# Patient Record
Sex: MALE | Race: WHITE | NOT HISPANIC OR LATINO | URBAN - METROPOLITAN AREA
[De-identification: names, ages, dates, MRNs, and addresses within clinical notes are randomized per-mention and may not be internally consistent; named-entity substitution may affect disease eponyms.]

---

## 2017-12-11 ENCOUNTER — APPOINTMENT (RX ONLY)
Dept: URBAN - METROPOLITAN AREA CLINIC 51 | Facility: CLINIC | Age: 47
Setting detail: DERMATOLOGY
End: 2017-12-11

## 2017-12-11 DIAGNOSIS — L81.7 PIGMENTED PURPURIC DERMATOSIS: ICD-10-CM

## 2017-12-11 PROBLEM — L30.9 DERMATITIS, UNSPECIFIED: Status: ACTIVE | Noted: 2017-12-11

## 2017-12-11 PROCEDURE — 99201: CPT

## 2017-12-11 PROCEDURE — ? ORDER TESTS

## 2017-12-11 PROCEDURE — ? TREATMENT REGIMEN

## 2017-12-11 PROCEDURE — ? PRESCRIPTION

## 2017-12-11 PROCEDURE — ? COUNSELING

## 2017-12-11 RX ORDER — CEPHALEXIN 500 MG/1
CAPSULE ORAL
Qty: 42 | Refills: 0 | Status: ERX | COMMUNITY
Start: 2017-12-11

## 2017-12-11 RX ORDER — MUPIROCIN 20 MG/G
OINTMENT TOPICAL
Qty: 1 | Refills: 1 | Status: ERX | COMMUNITY
Start: 2017-12-11

## 2017-12-11 RX ADMIN — MUPIROCIN: 20 OINTMENT TOPICAL at 00:00

## 2017-12-11 RX ADMIN — CEPHALEXIN: 500 CAPSULE ORAL at 00:00

## 2017-12-11 ASSESSMENT — SEVERITY ASSESSMENT: SEVERITY: MODERATE TO SEVERE

## 2017-12-11 ASSESSMENT — BSA RASH: BSA RASH: 5

## 2017-12-11 NOTE — PROCEDURE: ORDER TESTS
Lab Facility: [id number]
Performing Laboratory: [id number]
Bill For Surgical Tray: no
Billing Type: Third-Party Bill
Expected Date Of Service: 12/11/2017

## 2017-12-11 NOTE — PROCEDURE: TREATMENT REGIMEN
Detail Level: Zone
Initiate Treatment: Keflex 500 TID, Mupirocin BID
Plan: Will start triamcinolone at follow up. Culture taken from right foot.

## 2017-12-18 ENCOUNTER — RX ONLY (OUTPATIENT)
Age: 47
Setting detail: RX ONLY
End: 2017-12-18

## 2017-12-18 RX ORDER — CLINDAMYCIN HYDROCHLORIDE 300 MG/1
CAPSULE ORAL
Qty: 56 | Refills: 0 | Status: ERX | COMMUNITY
Start: 2017-12-18

## 2018-01-04 ENCOUNTER — APPOINTMENT (RX ONLY)
Dept: URBAN - METROPOLITAN AREA CLINIC 51 | Facility: CLINIC | Age: 48
Setting detail: DERMATOLOGY
End: 2018-01-04

## 2018-01-04 DIAGNOSIS — L81.7 PIGMENTED PURPURIC DERMATOSIS: ICD-10-CM

## 2018-01-04 PROBLEM — L30.9 DERMATITIS, UNSPECIFIED: Status: ACTIVE | Noted: 2018-01-04

## 2018-01-04 PROCEDURE — ? TREATMENT REGIMEN

## 2018-01-04 PROCEDURE — 99212 OFFICE O/P EST SF 10 MIN: CPT

## 2018-01-04 PROCEDURE — ? COUNSELING

## 2018-01-04 PROCEDURE — ? PRESCRIPTION

## 2018-01-04 RX ORDER — MUPIROCIN 20 MG/G
OINTMENT TOPICAL
Qty: 1 | Refills: 1 | Status: ERX

## 2018-01-04 RX ORDER — CLINDAMYCIN HYDROCHLORIDE 300 MG/1
CAPSULE ORAL
Qty: 40 | Refills: 0 | Status: ERX

## 2018-01-04 ASSESSMENT — BSA RASH: BSA RASH: 5

## 2018-01-04 ASSESSMENT — LOCATION ZONE DERM: LOCATION ZONE: FEET

## 2018-01-04 ASSESSMENT — LOCATION SIMPLE DESCRIPTION DERM: LOCATION SIMPLE: RIGHT FOOT

## 2018-01-04 ASSESSMENT — LOCATION DETAILED DESCRIPTION DERM: LOCATION DETAILED: RIGHT DORSAL FOOT

## 2018-01-04 ASSESSMENT — SEVERITY ASSESSMENT: SEVERITY: MODERATE TO SEVERE

## 2018-01-04 NOTE — PROCEDURE: TREATMENT REGIMEN
Detail Level: Zone
Plan: Gave patient information on Dr. Danielito Wall in [place] for vascular surgery.
Continue Regimen: Mupirocin\\nCleocin

## 2018-01-25 RX ORDER — CLINDAMYCIN HYDROCHLORIDE 300 MG/1
CAPSULE ORAL
Qty: 40 | Refills: 0 | Status: ERX

## 2018-01-29 ENCOUNTER — APPOINTMENT (RX ONLY)
Dept: URBAN - METROPOLITAN AREA CLINIC 51 | Facility: CLINIC | Age: 48
Setting detail: DERMATOLOGY
End: 2018-01-29

## 2018-01-29 DIAGNOSIS — L30.9 DERMATITIS, UNSPECIFIED: ICD-10-CM

## 2018-01-29 PROCEDURE — ? COUNSELING

## 2018-01-29 PROCEDURE — 99212 OFFICE O/P EST SF 10 MIN: CPT

## 2018-01-29 PROCEDURE — ? PRESCRIPTION

## 2018-01-29 RX ORDER — RETAPAMULIN 10 MG/G
OINTMENT TOPICAL
Qty: 1 | Refills: 0 | Status: ERX | COMMUNITY
Start: 2018-01-29

## 2018-01-29 RX ORDER — TRIAMCINOLONE ACETONIDE 1 MG/G
OINTMENT TOPICAL
Qty: 1 | Refills: 0 | Status: ERX | COMMUNITY
Start: 2018-01-29

## 2018-01-29 RX ADMIN — RETAPAMULIN: 10 OINTMENT TOPICAL at 00:00

## 2018-01-29 RX ADMIN — TRIAMCINOLONE ACETONIDE: 1 OINTMENT TOPICAL at 00:00

## 2018-01-29 ASSESSMENT — LOCATION DETAILED DESCRIPTION DERM
LOCATION DETAILED: RIGHT DORSAL FOOT
LOCATION DETAILED: LEFT DORSAL FOOT

## 2018-01-29 ASSESSMENT — BSA RASH: BSA RASH: 5

## 2018-01-29 ASSESSMENT — LOCATION SIMPLE DESCRIPTION DERM
LOCATION SIMPLE: LEFT FOOT
LOCATION SIMPLE: RIGHT FOOT

## 2018-01-29 ASSESSMENT — LOCATION ZONE DERM: LOCATION ZONE: FEET

## 2018-01-29 ASSESSMENT — SEVERITY ASSESSMENT: SEVERITY: MODERATE TO SEVERE

## 2018-01-30 ENCOUNTER — RX ONLY (OUTPATIENT)
Age: 48
Setting detail: RX ONLY
End: 2018-01-30

## 2018-01-30 RX ORDER — SILVER SULFADIAZINE 10 MG/G
CREAM TOPICAL
Qty: 50 | Refills: 0 | Status: ERX | COMMUNITY
Start: 2018-01-30

## 2018-05-10 ENCOUNTER — APPOINTMENT (RX ONLY)
Dept: URBAN - METROPOLITAN AREA CLINIC 51 | Facility: CLINIC | Age: 48
Setting detail: DERMATOLOGY
End: 2018-05-10

## 2018-05-10 DIAGNOSIS — L0390 CELLULITIS AND ABSCESS OF UNSPECIFIED SITES: ICD-10-CM

## 2018-05-10 DIAGNOSIS — L0391 CELLULITIS AND ABSCESS OF UNSPECIFIED SITES: ICD-10-CM

## 2018-05-10 PROBLEM — L03.115 CELLULITIS OF RIGHT LOWER LIMB: Status: ACTIVE | Noted: 2018-05-10

## 2018-05-10 PROCEDURE — ? PRESCRIPTION

## 2018-05-10 PROCEDURE — ? COUNSELING

## 2018-05-10 PROCEDURE — 99213 OFFICE O/P EST LOW 20 MIN: CPT

## 2018-05-10 RX ORDER — MUPIROCIN 20 MG/G
OINTMENT TOPICAL
Qty: 1 | Refills: 5 | Status: ERX

## 2018-05-10 RX ORDER — CLINDAMYCIN HYDROCHLORIDE 300 MG/1
CAPSULE ORAL
Qty: 120 | Refills: 1 | Status: ERX

## 2018-05-10 ASSESSMENT — LOCATION SIMPLE DESCRIPTION DERM: LOCATION SIMPLE: RIGHT FOOT

## 2018-05-10 ASSESSMENT — LOCATION ZONE DERM: LOCATION ZONE: FEET

## 2018-05-10 ASSESSMENT — LOCATION DETAILED DESCRIPTION DERM: LOCATION DETAILED: RIGHT DORSAL FOOT

## 2018-05-10 NOTE — HPI: RASH
How Severe Is Your Rash?: mild
Is This A New Presentation, Or A Follow-Up?: Rash
Additional History: Patient was treated for staph in January with cleocin and mupirocin

## 2018-07-25 ENCOUNTER — APPOINTMENT (RX ONLY)
Dept: URBAN - METROPOLITAN AREA CLINIC 51 | Facility: CLINIC | Age: 48
Setting detail: DERMATOLOGY
End: 2018-07-25

## 2018-07-25 DIAGNOSIS — L0391 CELLULITIS AND ABSCESS OF UNSPECIFIED SITES: ICD-10-CM

## 2018-07-25 DIAGNOSIS — L0390 CELLULITIS AND ABSCESS OF UNSPECIFIED SITES: ICD-10-CM

## 2018-07-25 PROBLEM — L03.115 CELLULITIS OF RIGHT LOWER LIMB: Status: ACTIVE | Noted: 2018-07-25

## 2018-07-25 PROCEDURE — ? TREATMENT REGIMEN

## 2018-07-25 PROCEDURE — 99212 OFFICE O/P EST SF 10 MIN: CPT

## 2018-07-25 PROCEDURE — ? PRESCRIPTION

## 2018-07-25 PROCEDURE — ? COUNSELING

## 2018-07-25 RX ORDER — SILVER SULFADIAZINE 10 MG/G
CREAM TOPICAL
Qty: 400 | Refills: 3 | Status: ERX

## 2018-07-25 ASSESSMENT — PAIN INTENSITY VAS: HOW INTENSE IS YOUR PAIN 0 BEING NO PAIN, 10 BEING THE MOST SEVERE PAIN POSSIBLE?: NO PAIN

## 2018-07-25 ASSESSMENT — LOCATION DETAILED DESCRIPTION DERM: LOCATION DETAILED: RIGHT DORSAL FOOT

## 2018-07-25 ASSESSMENT — LOCATION ZONE DERM: LOCATION ZONE: FEET

## 2018-07-25 ASSESSMENT — SEVERITY ASSESSMENT: SEVERITY: MILD TO MODERATE

## 2018-07-25 ASSESSMENT — LOCATION SIMPLE DESCRIPTION DERM: LOCATION SIMPLE: RIGHT FOOT

## 2018-07-25 NOTE — PROCEDURE: TREATMENT REGIMEN
Plan: Patient states Silvadene works best.
Initiate Treatment: Silvadene
Detail Level: Detailed
Otc Regimen: Domeboro soak

## 2018-08-16 ENCOUNTER — APPOINTMENT (RX ONLY)
Dept: URBAN - METROPOLITAN AREA CLINIC 51 | Facility: CLINIC | Age: 48
Setting detail: DERMATOLOGY
End: 2018-08-16

## 2018-08-16 DIAGNOSIS — L0390 CELLULITIS AND ABSCESS OF UNSPECIFIED SITES: ICD-10-CM | Status: STABLE

## 2018-08-16 DIAGNOSIS — L0391 CELLULITIS AND ABSCESS OF UNSPECIFIED SITES: ICD-10-CM | Status: STABLE

## 2018-08-16 PROBLEM — L03.115 CELLULITIS OF RIGHT LOWER LIMB: Status: ACTIVE | Noted: 2018-08-16

## 2018-08-16 PROCEDURE — 99212 OFFICE O/P EST SF 10 MIN: CPT

## 2018-08-16 PROCEDURE — ? COUNSELING

## 2018-08-16 PROCEDURE — ? TREATMENT REGIMEN

## 2018-08-16 ASSESSMENT — LOCATION DETAILED DESCRIPTION DERM: LOCATION DETAILED: RIGHT DORSAL FOOT

## 2018-08-16 ASSESSMENT — SEVERITY ASSESSMENT: SEVERITY: MILD

## 2018-08-16 ASSESSMENT — LOCATION ZONE DERM: LOCATION ZONE: FEET

## 2018-08-16 ASSESSMENT — PAIN INTENSITY VAS: HOW INTENSE IS YOUR PAIN 0 BEING NO PAIN, 10 BEING THE MOST SEVERE PAIN POSSIBLE?: NO PAIN

## 2018-08-16 ASSESSMENT — LOCATION SIMPLE DESCRIPTION DERM: LOCATION SIMPLE: RIGHT FOOT

## 2018-08-16 NOTE — PROCEDURE: TREATMENT REGIMEN
Plan: Patient states that he plans to call Dr Wall in [place] for an appointment. Per Slava he may call for any refills without an appointment
Continue Regimen: Silvadene and domeboro soaks
Detail Level: Detailed

## 2018-09-06 ENCOUNTER — APPOINTMENT (RX ONLY)
Dept: URBAN - METROPOLITAN AREA CLINIC 51 | Facility: CLINIC | Age: 48
Setting detail: DERMATOLOGY
End: 2018-09-06

## 2018-09-06 DIAGNOSIS — L0390 CELLULITIS AND ABSCESS OF UNSPECIFIED SITES: ICD-10-CM

## 2018-09-06 DIAGNOSIS — L0391 CELLULITIS AND ABSCESS OF UNSPECIFIED SITES: ICD-10-CM

## 2018-09-06 PROBLEM — L03.115 CELLULITIS OF RIGHT LOWER LIMB: Status: ACTIVE | Noted: 2018-09-06

## 2018-09-06 PROBLEM — L03.116 CELLULITIS OF LEFT LOWER LIMB: Status: ACTIVE | Noted: 2018-09-06

## 2018-09-06 PROCEDURE — ? ORDER TESTS

## 2018-09-06 ASSESSMENT — LOCATION SIMPLE DESCRIPTION DERM
LOCATION SIMPLE: RIGHT PRETIBIAL REGION
LOCATION SIMPLE: LEFT PRETIBIAL REGION

## 2018-09-06 ASSESSMENT — LOCATION DETAILED DESCRIPTION DERM
LOCATION DETAILED: RIGHT PROXIMAL PRETIBIAL REGION
LOCATION DETAILED: LEFT PROXIMAL PRETIBIAL REGION

## 2018-09-06 ASSESSMENT — LOCATION ZONE DERM: LOCATION ZONE: LEG

## 2018-09-06 NOTE — PROCEDURE: ORDER TESTS
Expected Date Of Service: 09/06/2018
Performing Laboratory: [id number]
Lab Facility: [id number]
Billing Type: Third-Party Bill
Bill For Surgical Tray: no

## 2018-09-10 ENCOUNTER — APPOINTMENT (RX ONLY)
Dept: URBAN - METROPOLITAN AREA CLINIC 51 | Facility: CLINIC | Age: 48
Setting detail: DERMATOLOGY
End: 2018-09-10

## 2018-09-10 DIAGNOSIS — M79.89 OTHER SPECIFIED SOFT TISSUE DISORDERS: ICD-10-CM

## 2018-09-10 DIAGNOSIS — I83.81 VARICOSE VEINS OF LOWER EXTREMITIES WITH PAIN: ICD-10-CM

## 2018-09-10 DIAGNOSIS — M79.6 PAIN IN LIMB, HAND, FOOT, FINGERS AND TOES: ICD-10-CM

## 2018-09-10 PROBLEM — I83.819 VARICOSE VEINS OF UNSPECIFIED LOWER EXTREMITY WITH PAIN: Status: ACTIVE | Noted: 2018-09-10

## 2018-09-10 PROBLEM — M79.609 PAIN IN UNSPECIFIED LIMB: Status: ACTIVE | Noted: 2018-09-10

## 2018-09-10 PROCEDURE — ? ORDER TESTS

## 2018-09-10 NOTE — PROCEDURE: ORDER TESTS
Billing Type: Third-Party Bill
Expected Date Of Service: 09/10/2018
Bill For Surgical Tray: no
Lab Facility: [id number]
Performing Laboratory: [id number]
Lab Facility: [id number]
Expected Date Of Service: 09/12/2018
Billing Type: Third-Party Bill
Performing Laboratory: [id number]

## 2018-09-13 ENCOUNTER — APPOINTMENT (RX ONLY)
Dept: URBAN - METROPOLITAN AREA CLINIC 51 | Facility: CLINIC | Age: 48
Setting detail: DERMATOLOGY
End: 2018-09-13

## 2018-09-13 DIAGNOSIS — M79.6 PAIN IN LIMB, HAND, FOOT, FINGERS AND TOES: ICD-10-CM

## 2018-09-13 PROBLEM — M79.662 PAIN IN LEFT LOWER LEG: Status: ACTIVE | Noted: 2018-09-13

## 2018-09-13 PROBLEM — M79.661 PAIN IN RIGHT LOWER LEG: Status: ACTIVE | Noted: 2018-09-13

## 2018-09-13 PROCEDURE — ? ORDER TESTS

## 2018-09-13 ASSESSMENT — LOCATION DETAILED DESCRIPTION DERM
LOCATION DETAILED: LEFT DISTAL PRETIBIAL REGION
LOCATION DETAILED: RIGHT DISTAL PRETIBIAL REGION

## 2018-09-13 ASSESSMENT — LOCATION SIMPLE DESCRIPTION DERM
LOCATION SIMPLE: RIGHT PRETIBIAL REGION
LOCATION SIMPLE: LEFT PRETIBIAL REGION

## 2018-09-13 ASSESSMENT — LOCATION ZONE DERM: LOCATION ZONE: LEG

## 2018-09-13 NOTE — PROCEDURE: ORDER TESTS
Expected Date Of Service: 09/13/2018
Billing Type: Third-Party Bill
Bill For Surgical Tray: no
Performing Laboratory: [id number]
Lab Facility: [id number]